# Patient Record
Sex: FEMALE | Race: OTHER | URBAN - METROPOLITAN AREA
[De-identification: names, ages, dates, MRNs, and addresses within clinical notes are randomized per-mention and may not be internally consistent; named-entity substitution may affect disease eponyms.]

---

## 2019-06-03 ENCOUNTER — EMERGENCY (EMERGENCY)
Facility: HOSPITAL | Age: 29
LOS: 1 days | Discharge: HOME | End: 2019-06-03
Attending: EMERGENCY MEDICINE | Admitting: EMERGENCY MEDICINE
Payer: MEDICAID

## 2019-06-03 VITALS
HEART RATE: 111 BPM | DIASTOLIC BLOOD PRESSURE: 74 MMHG | OXYGEN SATURATION: 100 % | SYSTOLIC BLOOD PRESSURE: 126 MMHG | RESPIRATION RATE: 18 BRPM | TEMPERATURE: 98 F

## 2019-06-03 PROCEDURE — 99283 EMERGENCY DEPT VISIT LOW MDM: CPT

## 2019-06-03 RX ORDER — ONDANSETRON 8 MG/1
4 TABLET, FILM COATED ORAL ONCE
Refills: 0 | Status: DISCONTINUED | OUTPATIENT
Start: 2019-06-03 | End: 2019-06-07

## 2019-06-03 RX ORDER — SODIUM CHLORIDE 9 MG/ML
1000 INJECTION INTRAMUSCULAR; INTRAVENOUS; SUBCUTANEOUS ONCE
Refills: 0 | Status: DISCONTINUED | OUTPATIENT
Start: 2019-06-03 | End: 2019-06-07

## 2019-06-03 RX ORDER — FAMOTIDINE 10 MG/ML
20 INJECTION INTRAVENOUS ONCE
Refills: 0 | Status: DISCONTINUED | OUTPATIENT
Start: 2019-06-03 | End: 2019-06-03

## 2019-06-03 RX ORDER — ONDANSETRON 8 MG/1
1 TABLET, FILM COATED ORAL
Qty: 12 | Refills: 0
Start: 2019-06-03 | End: 2019-06-06

## 2019-06-03 NOTE — ED PROVIDER NOTE - NS ED ROS FT
GENERAL: no fever, chills  HEENT: no cough, congestion, odynophagia, dysphagia  CARDIAC: no chest pain, palpitations, lightheadedness  PULM: no dyspnea, wheezing   GI: + nausea, vomiting, diarrhea  : no urinary dysuria, frequency, incontinence, hematuria  NEURO: no headache, motor weakness, sensory changes  MSK: + myalgias  SKIN: no rashes, hives  HEME: no active bleeding, bruising

## 2019-06-03 NOTE — ED PROVIDER NOTE - ATTENDING CONTRIBUTION TO CARE
Seen and examined, NAD at time of exam, +sick contacts, states 1d of N/V/D, last vomited 10am, watery diarrhea, non-bloody, no melena, denies fever but felt "hot," tayler. po gatorade and ginger ale PTA. Pt. was having cramping pain, but improved after b.m., no c/o abd pain in ED. Clear lungs, heart reg, abd soft, NT abd, no edema, NT calves.

## 2019-06-03 NOTE — ED PROVIDER NOTE - CLINICAL SUMMARY MEDICAL DECISION MAKING FREE TEXT BOX
28F otherwise healthy presenting with 1 day of nausea, vomiting, diarrhea. Nontender abdomen. Likely viral gastroenteritis. Plan - basics, lipase, fluids, zofran, reassess.

## 2019-06-03 NOTE — ED PROVIDER NOTE - NSFOLLOWUPINSTRUCTIONS_ED_ALL_ED_FT
Your diagnosis: viral gastroenteritis    Discharge instructions:    1. Please follow up with your Primary Care Doctor in 1-2 days.    2. Take any prescribed medications as instructed: zofran 4 mg every 8 hours as needed for nausea.    3. Others:    4. Be sure to return to the ED if you develop new or worsening symptoms. Specific signs and symptoms to be vigilant of: worsening abdominal pain, pain that radiates to the back or groin, pain that turns from vague to sharp, associated nausea or vomiting, worsening diarrhea or constipation, blood in the stool, black, tarry stools, excessive vomiting, blood or bile in the vomit, pain associated with lightheadedness, shortness of breath or chest pain.

## 2019-06-03 NOTE — ED PROVIDER NOTE - OBJECTIVE STATEMENT
28F otherwise healthy presenting with 1 day of nausea, vomiting, diarrhea. Had [employer] yesterday night. Awoke with these symptoms. Since then, had 4-5 episodes of NBNB emesis, 4-5 episodes of nonbloody diarrhea. Complaining of myalgias as well. No fever, chills, urinary symptoms, back pain, abdominal, flank pain.

## 2019-06-03 NOTE — ED PROVIDER NOTE - PROGRESS NOTE DETAILS
Sincere: Pt. NAD, tayler liquids, no c/o abd pain, NT to exam, ambulating easily, family member with short self-limited illness. UCG neg., Ur. dip neg for signs of infection. Discussed prn antiemetic, prn fever ctrl, and liquids as tolerated and to forego further lab testing and IVF in ED. Pt. amenable.

## 2019-06-03 NOTE — ED PROVIDER NOTE - PHYSICAL EXAMINATION
GENERAL: no acute distress, non-toxic appearing  HEAD: normocephalic, atraumatic  HEENT: normal conjunctiva, oral mucosa moist, neck supple  CARDIAC: regular rate and rhythm, normal S1 and S2,  no appreciable murmurs  PULM: clear to ascultation bilaterally, no crackles, rales, rhonchi, or wheezing  GI: abdomen nondistended, soft, nontender, no guarding or rebound tenderness  NEURO: alert and oriented x 3, normal speech, PERRLA, EOMI, no focal motor or sensory deficits, nonantalgic gait  MSK: no visible deformities, no peripheral edema, calf tenderness/redness/swelling  SKIN: no visible rashes, dry, well-perfused  PSYCH: appropriate mood and affect